# Patient Record
Sex: MALE | Race: WHITE | NOT HISPANIC OR LATINO | Employment: FULL TIME | URBAN - METROPOLITAN AREA
[De-identification: names, ages, dates, MRNs, and addresses within clinical notes are randomized per-mention and may not be internally consistent; named-entity substitution may affect disease eponyms.]

---

## 2017-01-28 ENCOUNTER — APPOINTMENT (OUTPATIENT)
Dept: RADIOLOGY | Facility: MEDICAL CENTER | Age: 22
DRG: 699 | End: 2017-01-28
Attending: EMERGENCY MEDICINE
Payer: COMMERCIAL

## 2017-01-28 ENCOUNTER — HOSPITAL ENCOUNTER (OUTPATIENT)
Dept: RADIOLOGY | Facility: MEDICAL CENTER | Age: 22
End: 2017-01-28

## 2017-01-28 ENCOUNTER — HOSPITAL ENCOUNTER (INPATIENT)
Facility: MEDICAL CENTER | Age: 22
LOS: 1 days | DRG: 699 | End: 2017-01-30
Attending: EMERGENCY MEDICINE | Admitting: SURGERY
Payer: COMMERCIAL

## 2017-01-28 LAB
ABO GROUP BLD: NORMAL
ABO GROUP BLD: NORMAL
ALBUMIN SERPL BCP-MCNC: 4.7 G/DL (ref 3.2–4.9)
ALBUMIN/GLOB SERPL: 1.9 G/DL
ALP SERPL-CCNC: 55 U/L (ref 30–99)
ALT SERPL-CCNC: 17 U/L (ref 2–50)
ANION GAP SERPL CALC-SCNC: 13 MMOL/L (ref 0–11.9)
APTT PPP: 26 SEC (ref 24.7–36)
AST SERPL-CCNC: 27 U/L (ref 12–45)
BASOPHILS # BLD AUTO: 0.3 % (ref 0–1.8)
BASOPHILS # BLD: 0.03 K/UL (ref 0–0.12)
BILIRUB SERPL-MCNC: 1 MG/DL (ref 0.1–1.5)
BLD GP AB SCN SERPL QL: NORMAL
BUN SERPL-MCNC: 16 MG/DL (ref 8–22)
CALCIUM SERPL-MCNC: 9.2 MG/DL (ref 8.5–10.5)
CHLORIDE SERPL-SCNC: 103 MMOL/L (ref 96–112)
CO2 SERPL-SCNC: 21 MMOL/L (ref 20–33)
CREAT SERPL-MCNC: 1.72 MG/DL (ref 0.5–1.4)
EOSINOPHIL # BLD AUTO: 0.01 K/UL (ref 0–0.51)
EOSINOPHIL NFR BLD: 0.1 % (ref 0–6.9)
ERYTHROCYTE [DISTWIDTH] IN BLOOD BY AUTOMATED COUNT: 44 FL (ref 35.9–50)
GFR SERPL CREATININE-BSD FRML MDRD: 50 ML/MIN/1.73 M 2
GLOBULIN SER CALC-MCNC: 2.5 G/DL (ref 1.9–3.5)
GLUCOSE SERPL-MCNC: 116 MG/DL (ref 65–99)
HCT VFR BLD AUTO: 42.8 % (ref 42–52)
HGB BLD-MCNC: 15 G/DL (ref 14–18)
IMM GRANULOCYTES # BLD AUTO: 0.04 K/UL (ref 0–0.11)
IMM GRANULOCYTES NFR BLD AUTO: 0.3 % (ref 0–0.9)
INR PPP: 1.03 (ref 0.87–1.13)
LYMPHOCYTES # BLD AUTO: 0.68 K/UL (ref 1–4.8)
LYMPHOCYTES NFR BLD: 5.8 % (ref 22–41)
MCH RBC QN AUTO: 33.3 PG (ref 27–33)
MCHC RBC AUTO-ENTMCNC: 35 G/DL (ref 33.7–35.3)
MCV RBC AUTO: 94.9 FL (ref 81.4–97.8)
MONOCYTES # BLD AUTO: 0.27 K/UL (ref 0–0.85)
MONOCYTES NFR BLD AUTO: 2.3 % (ref 0–13.4)
NEUTROPHILS # BLD AUTO: 10.64 K/UL (ref 1.82–7.42)
NEUTROPHILS NFR BLD: 91.2 % (ref 44–72)
NRBC # BLD AUTO: 0 K/UL
NRBC BLD AUTO-RTO: 0 /100 WBC
PLATELET # BLD AUTO: 202 K/UL (ref 164–446)
PMV BLD AUTO: 9.1 FL (ref 9–12.9)
POTASSIUM SERPL-SCNC: 4.5 MMOL/L (ref 3.6–5.5)
PROT SERPL-MCNC: 7.2 G/DL (ref 6–8.2)
PROTHROMBIN TIME: 13.8 SEC (ref 12–14.6)
RBC # BLD AUTO: 4.51 M/UL (ref 4.7–6.1)
RH BLD: NORMAL
SODIUM SERPL-SCNC: 137 MMOL/L (ref 135–145)
WBC # BLD AUTO: 11.7 K/UL (ref 4.8–10.8)

## 2017-01-28 PROCEDURE — 700111 HCHG RX REV CODE 636 W/ 250 OVERRIDE (IP): Performed by: EMERGENCY MEDICINE

## 2017-01-28 PROCEDURE — 81001 URINALYSIS AUTO W/SCOPE: CPT

## 2017-01-28 PROCEDURE — 36415 COLL VENOUS BLD VENIPUNCTURE: CPT

## 2017-01-28 PROCEDURE — 94760 N-INVAS EAR/PLS OXIMETRY 1: CPT

## 2017-01-28 PROCEDURE — G0390 TRAUMA RESPONS W/HOSP CRITI: HCPCS

## 2017-01-28 PROCEDURE — 96376 TX/PRO/DX INJ SAME DRUG ADON: CPT

## 2017-01-28 PROCEDURE — 80053 COMPREHEN METABOLIC PANEL: CPT

## 2017-01-28 PROCEDURE — 86850 RBC ANTIBODY SCREEN: CPT

## 2017-01-28 PROCEDURE — 305948 HCHG GREEN TRAUMA ACT PRE-NOTIFY NO CC

## 2017-01-28 PROCEDURE — 87086 URINE CULTURE/COLONY COUNT: CPT

## 2017-01-28 PROCEDURE — 85730 THROMBOPLASTIN TIME PARTIAL: CPT

## 2017-01-28 PROCEDURE — 85025 COMPLETE CBC W/AUTO DIFF WBC: CPT

## 2017-01-28 PROCEDURE — 85610 PROTHROMBIN TIME: CPT

## 2017-01-28 PROCEDURE — 86900 BLOOD TYPING SEROLOGIC ABO: CPT

## 2017-01-28 PROCEDURE — 99285 EMERGENCY DEPT VISIT HI MDM: CPT

## 2017-01-28 PROCEDURE — 71010 DX-CHEST-LIMITED (1 VIEW): CPT

## 2017-01-28 PROCEDURE — 86901 BLOOD TYPING SEROLOGIC RH(D): CPT

## 2017-01-28 PROCEDURE — 96375 TX/PRO/DX INJ NEW DRUG ADDON: CPT

## 2017-01-28 PROCEDURE — 96374 THER/PROPH/DIAG INJ IV PUSH: CPT

## 2017-01-28 RX ORDER — ONDANSETRON 2 MG/ML
4 INJECTION INTRAMUSCULAR; INTRAVENOUS ONCE
Status: COMPLETED | OUTPATIENT
Start: 2017-01-28 | End: 2017-01-28

## 2017-01-28 RX ADMIN — ONDANSETRON 4 MG: 2 INJECTION, SOLUTION INTRAMUSCULAR; INTRAVENOUS at 23:43

## 2017-01-28 RX ADMIN — HYDROMORPHONE HYDROCHLORIDE 0.5 MG: 1 INJECTION, SOLUTION INTRAMUSCULAR; INTRAVENOUS; SUBCUTANEOUS at 23:43

## 2017-01-28 NOTE — IP AVS SNAPSHOT
1/30/2017          Job Morrison  [address] [place] [address]    Dear Job:    [facility] wants to ensure your discharge home is safe and you or your loved ones have had all your questions answered regarding your care after you leave the hospital.    You may receive a telephone call within two days of your discharge.  This call is to make certain you understand your discharge instructions as well as ensure we provided you with the best care possible during your stay with us.     The call will only last approximately 3-5 minutes and will be done by a nurse.    Once again, we want to ensure your discharge home is safe and that you have a clear understanding of any next steps in your care.  If you have any questions or concerns, please do not hesitate to contact us, we are here for you.  Thank you for choosing [facility] for your healthcare needs.    Sincerely,    Bro Umanzor    [facility]

## 2017-01-28 NOTE — IP AVS SNAPSHOT
" After Visit Summary                                                                                                                  Name:Job Morrison  Medical Record Number:[id number]  CSN: [id number]    YOB: 1995   Age: 21 y.o.  Sex: male  HT:1.778 m (5' 10\") WT: 63.9 kg (140 lb 14 oz)          Admit Date: 1/28/2017     Discharge Date:   Today's Date: 1/30/2017  Attending Doctor:  Enoc Capellan M.D.                  Allergies:  Review of patient's allergies indicates no known allergies.            Discharge Instructions       Discharge Instructions    Discharged to home by car with relative. Discharged via wheelchair, hospital escort: Yes.  Special equipment needed: Not Applicable    Be sure to schedule a follow-up appointment with your primary care doctor or any specialists as instructed.     Discharge Plan:   Diet Plan: Discussed  Activity Level: Discussed  Confirmed Follow up Appointment: Patient to Call and Schedule Appointment  Confirmed Symptoms Management: Discussed  Medication Reconciliation Updated: Yes  Influenza Vaccine Indication: Patient Refuses    I understand that a diet low in cholesterol, fat, and sodium is recommended for good health. Unless I have been given specific instructions below for another diet, I accept this instruction as my diet prescription.   Other diet:     Special Instructions:     - Call or seek medical attention for questions or concerns   - Follow up with Dr. Capellan or provider in [place] in 1 weeks time   - Follow up with primary care provider within one weeks time   - Resume regular diet   - Continue daily over the counter stool softener while on narcotics   - No operation of machinery or motorized vehicles while under the influence of narcotics   - No alcohol use while under the influence of narcotics   - No swimming, hot tubs, baths or wound submersion until cleared by outpatient provider. May shower   - No contact sports, strenuous activities, or heavy " lifting until cleared by outpatient provider   - If respiratory decompensation, inability to void or blood in urine, or signs or symptoms of infection occur seek medical attention      · Is patient discharged on Warfarin / Coumadin?   No     · Is patient Post Blood Transfusion?  No    Depression / Suicide Risk    As you are discharged from this [facility] facility, it is important to learn how to keep safe from harming yourself.    Recognize the warning signs:  · Abrupt changes in personality, positive or negative- including increase in energy   · Giving away possessions  · Change in eating patterns- significant weight changes-  positive or negative  · Change in sleeping patterns- unable to sleep or sleeping all the time   · Unwillingness or inability to communicate  · Depression  · Unusual sadness, discouragement and loneliness  · Talk of wanting to die  · Neglect of personal appearance   · Rebelliousness- reckless behavior  · Withdrawal from people/activities they love  · Confusion- inability to concentrate     If you or a loved one observes any of these behaviors or has concerns about self-harm, here's what you can do:  · Talk about it- your feelings and reasons for harming yourself  · Remove any means that you might use to hurt yourself (examples: pills, rope, extension cords, firearm)  · Get professional help from the community (Mental Health, Substance Abuse, psychological counseling)  · Do not be alone:Call your Safe Contact- someone whom you trust who will be there for you.  · Call your local CRISIS HOTLINE 424-5884 or 243-574-7235  · Call your local Children's Mobile Crisis Response Team [place] (582) 085-1785 or www.[employer]  · Call the toll free National Suicide Prevention Hotlines   · National Suicide Prevention Lifeline 628-631-SQSI (4700)  · National Hope Line Network 800-SUICIDE (632-4273)    Acute Kidney Injury  Acute kidney injury is any condition in which there is sudden (acute)  damage to the kidneys. Acute kidney injury was previously known as acute kidney failure or acute renal failure. The kidneys are two organs that lie on either side of the spine between the middle of the back and the front of the abdomen. The kidneys:  · Remove wastes and extra water from the blood.    · Produce important hormones. These help keep bones strong, regulate blood pressure, and help create red blood cells.    · Balance the fluids and chemicals in the blood and tissues.  A small amount of kidney damage may not cause problems, but a large amount of damage may make it difficult or impossible for the kidneys to work the way they should. Acute kidney injury may develop into long-lasting (chronic) kidney disease. It may also develop into a life-threatening disease called end-stage kidney disease. Acute kidney injury can get worse very quickly, so it should be treated right away. Early treatment may prevent other kidney diseases from developing.  CAUSES   · A problem with blood flow to the kidneys. This may be caused by:    ¨ Blood loss.    ¨ Heart disease.    ¨ Severe burns.    ¨ Liver disease.  · Direct damage to the kidneys. This may be caused by:  ¨ Some medicines.    ¨ A kidney infection.    ¨ Poisoning or consuming toxic substances.    ¨ A surgical wound.    ¨ A blow to the kidney area.    · A problem with urine flow. This may be caused by:    ¨ Cancer.    ¨ Kidney stones.    ¨ An enlarged prostate.  SIGNS AND SYMPTOMS   · Swelling (edema) of the legs, ankles, or feet.    · Tiredness (lethargy).    · Nausea or vomiting.    · Confusion.    · Problems with urination, such as:    ¨ Painful or burning feeling during urination.    ¨ Decreased urine production.    ¨ Frequent accidents in children who are potty trained.    ¨ Bloody urine.    · Muscle twitches and cramps.    · Shortness of breath.    · Seizures.    · Chest pain or pressure.  Sometimes, no symptoms are present.   DIAGNOSIS  Acute kidney injury may  be detected and diagnosed by tests, including blood, urine, imaging, or kidney biopsy tests.   TREATMENT  Treatment of acute kidney injury varies depending on the cause and severity of the kidney damage. In mild cases, no treatment may be needed. The kidneys may heal on their own. If acute kidney injury is more severe, your health care provider will treat the cause of the kidney damage, help the kidneys heal, and prevent complications from occurring. Severe cases may require a procedure to remove toxic wastes from the body (dialysis) or surgery to repair kidney damage. Surgery may involve:   · Repair of a torn kidney.    · Removal of an obstruction.  HOME CARE INSTRUCTIONS  · Follow your prescribed diet.  · Take medicines only as directed by your health care provider.   · Do not take any new medicines (prescription, over-the-counter, or nutritional supplements) unless approved by your health care provider. Many medicines can worsen your kidney damage or may need to have the dose adjusted.    · Keep all follow-up visits as directed by your health care provider. This is important.  · Observe your condition to make sure you are healing as expected.  SEEK IMMEDIATE MEDICAL CARE IF:  · You are feeling ill or have severe pain in the back or side.    · Your symptoms return or you have new symptoms.  · You have any symptoms of end-stage kidney disease. These include:    ¨ Persistent itchiness.    ¨ Loss of appetite.    ¨ Headaches.    ¨ Abnormally dark or light skin.  ¨ Numbness in the hands or feet.    ¨ Easy bruising.    ¨ Frequent hiccups.    ¨ Menstruation stops.    · You have a fever.  · You have increased urine production.  · You have pain or bleeding when urinating.  MAKE SURE YOU:   · Understand these instructions.  · Will watch your condition.  · Will get help right away if you are not doing well or get worse.     This information is not intended to replace advice given to you by your health care provider. Make  sure you discuss any questions you have with your health care provider.     Document Released: 07/02/2012 Document Revised: 01/08/2016 Document Reviewed: 08/16/2013  [employer] Interactive Patient Education ©2016 [employer]        Follow-up Information     1. Follow up with Enoc Capellan M.D.. Schedule an appointment as soon as possible for a visit in 1 week.    Specialty:  Surgery    Contact information    [address] [facility] [address]  [place] [address] [phone]          2. Schedule an appointment as soon as possible for a visit with Pcp Not In Computer.    Specialty:  Family Medicine         Discharge Medication Instructions:    Below are the medications your physician expects you to take upon discharge:    Review all your home medications and newly ordered medications with your doctor and/or pharmacist. Follow medication instructions as directed by your doctor and/or pharmacist.    Please keep your medication list with you and share with your physician.               Medication List      START taking these medications        Instructions    acetaminophen 325 MG Tabs   Last time this was given:  650 mg on 1/30/2017  1:13 PM   Commonly known as:  TYLENOL    Take 2 Tabs by mouth every 6 hours.   Dose:  650 mg        MG Caps   Last time this was given:  100 mg on 1/30/2017  1:13 PM    Doctor's comments:  While on narcotic pain medication   Take 100 mg by mouth 2 times a day.   Dose:  100 mg       oxycodone immediate-release 5 MG Tabs   Last time this was given:  5 mg on 1/30/2017  9:03 AM   Commonly known as:  ROXICODONE    Take 1 Tab by mouth every 3 hours as needed (Moderate Pain (NRS Pain Scale 4-6; CPOT Pain Scale 3-5)).   Dose:  5 mg               Instructions           Diet / Nutrition:    Follow any diet instructions given to you by your doctor or the dietician, including how much salt (sodium) you are allowed each day.    If you are overweight, talk to your doctor about a weight reduction  plan.    Activity:    Remain physically active following your doctor's instructions about exercise and activity.    Rest often.     Any time you become even a little tired or short of breath, SIT DOWN and rest.    Worsening Symptoms:    Report any of the following signs and symptoms to the doctor's office immediately:    *Pain of jaw, arm, or neck  *Chest pain not relieved by medication                               *Dizziness or loss of consciousness  *Difficulty breathing even when at rest   *More tired than usual                                       *Bleeding drainage or swelling of surgical site  *Swelling of feet, ankles, legs or stomach                 *Fever (>100ºF)  *Pink or blood tinged sputum  *Weight gain (3lbs/day or 5lbs /week)           *Shock from internal defibrillator (if applicable)  *Palpitations or irregular heartbeats                *Cool and/or numb extremities    Stroke Awareness    Common Risk Factors for Stroke include:    Age  Atrial Fibrillation  Carotid Artery Stenosis  Diabetes Mellitus  Excessive alcohol consumption  High blood pressure  Overweight   Physical inactivity  Smoking    Warning signs and symptoms of a stroke include:    *Sudden numbness or weakness of the face, arm or leg (especially on one side of the body).  *Sudden confusion, trouble speaking or understanding.  *Sudden trouble seeing in one or both eyes.  *Sudden trouble walking, dizziness, loss of balance or coordination.Sudden severe headache with no known cause.    It is very important to get treatment quickly when a stroke occurs. If you experience any of the above warning signs, call 911 immediately.                   Disclaimer         Quit Smoking / Tobacco Use:    I understand the use of any tobacco products increases my chance of suffering from future heart disease or stroke and could cause other illnesses which may shorten my life. Quitting the use of tobacco products is the single most important thing I can  do to improve my health. For further information on smoking / tobacco cessation call a Toll Free Quit Line at 1-525.968.4899 (*National Cancer [place]) or 1-744.198.1485 (American Lung Association) or you can access the web based program at www.lungusa.org.    [place] Tobacco Users Help Line:  (881) 149-6785       Toll Free: 1-865.718.5143  Quit Tobacco Program [facility] (667)825-4117    Crisis Hotline:    [place] Crisis Hotline:  8-369-KHPJJAI or 1-300.924.8411    [place] Crisis Hotline:    1-413.254.6380 or 861-623-0400    Discharge Survey:   Thank you for choosing [facility]. We hope we did everything we could to make your hospital stay a pleasant one. You may be receiving a phone survey and we would appreciate your time and participation in answering the questions. Your input is very valuable to us in our efforts to improve our service to our patients and their families.        My signature on this form indicates that:    1. I have reviewed and understand the above information.  2. My questions regarding this information have been answered to my satisfaction.  3. I have formulated a plan with my discharge nurse to obtain my prescribed medications for home.                  Disclaimer         __________________________________                     __________       ________                       Patient Signature                                                 Date                    Time

## 2017-01-28 NOTE — IP AVS SNAPSHOT
Funambol Access Code: [email or web address]  Expires: 2/28/2017 12:25 AM    Funambol  A secure, online tool to manage your health information     [employer]’s Funambol® is a secure, online tool that connects you to your personalized health information from the privacy of your home -- day or night - making it very easy for you to manage your healthcare. Once the activation process is completed, you can even access your medical information using the Funambol miranda, which is available for free in the Apple Miranda store or Google Play store.     Funambol provides the following levels of access (as shown below):   My Chart Features   [facility] Primary Care Doctor [facility]  Specialists [facility]  Urgent  Care Non-[facility]  Primary Care  Doctor   Email your healthcare team securely and privately 24/7 X X X X   Manage appointments: schedule your next appointment; view details of past/upcoming appointments X      Request prescription refills. X      View recent personal medical records, including lab and immunizations X X X X   View health record, including health history, allergies, medications X X X X   Read reports about your outpatient visits, procedures, consult and ER notes X X X X   See your discharge summary, which is a recap of your hospital and/or ER visit that includes your diagnosis, lab results, and care plan. X X       How to register for Funambol:  1. Go to  [email or web address].  2. Click on the Sign Up Now box, which takes you to the New Member Sign Up page. You will need to provide the following information:  a. Enter your Funambol Access Code exactly as it appears at the top of this page. (You will not need to use this code after you’ve completed the sign-up process. If you do not sign up before the expiration date, you must request a new code.)   b. Enter your date of birth.   c. Enter your home email address.   d. Click Submit, and follow the next screen’s instructions.  3. Create a Funambol ID. This will be your Funambol  login ID and cannot be changed, so think of one that is secure and easy to remember.  4. Create a [employer] password. You can change your password at any time.  5. Enter your Password Reset Question and Answer. This can be used at a later time if you forget your password.   6. Enter your e-mail address. This allows you to receive e-mail notifications when new information is available in [employer].  7. Click Sign Up. You can now view your health information.    For assistance activating your [employer] account, call [phone]

## 2017-01-28 NOTE — IP AVS SNAPSHOT
" <p align=\"LEFT\"><IMG SRC=\"//[email or web address]$/[email or web address]\" alt=\"Image\" WIDTH=\"50%\" HEIGHT=\"200\" BORDER=\"\"></p>                   Name:Job Morrison  Medical Record Number:[id number]  CSN: [id number]    YOB: 1995   Age: 21 y.o.  Sex: male  HT:1.778 m (5' 10\") WT: 63.9 kg (140 lb 14 oz)          Admit Date: 1/28/2017     Discharge Date:   Today's Date: 1/30/2017  Attending Doctor:  Enoc Capellan M.D.                  Allergies:  Review of patient's allergies indicates no known allergies.          Follow-up Information     1. Follow up with Enoc Capellan M.D.. Schedule an appointment as soon as possible for a visit in 1 week.    Specialty:  Surgery    Contact information    [address] [facility] [address]  [place] [address] [phone]          2. Schedule an appointment as soon as possible for a visit with Pcp Not In Computer.    Specialty:  Family Medicine         Medication List      Take these Medications        Instructions    acetaminophen 325 MG Tabs   Commonly known as:  TYLENOL    Take 2 Tabs by mouth every 6 hours.   Dose:  650 mg        MG Caps    Doctor's comments:  While on narcotic pain medication   Take 100 mg by mouth 2 times a day.   Dose:  100 mg       oxycodone immediate-release 5 MG Tabs   Commonly known as:  ROXICODONE    Take 1 Tab by mouth every 3 hours as needed (Moderate Pain (NRS Pain Scale 4-6; CPOT Pain Scale 3-5)).   Dose:  5 mg         "

## 2017-01-29 PROBLEM — S37.001A: Status: ACTIVE | Noted: 2017-01-29

## 2017-01-29 LAB
ANION GAP SERPL CALC-SCNC: 6 MMOL/L (ref 0–11.9)
APPEARANCE UR: CLEAR
BASOPHILS # BLD AUTO: 0.2 % (ref 0–1.8)
BASOPHILS # BLD: 0.02 K/UL (ref 0–0.12)
BILIRUB UR QL STRIP.AUTO: NEGATIVE
BUN SERPL-MCNC: 12 MG/DL (ref 8–22)
CALCIUM SERPL-MCNC: 8.8 MG/DL (ref 8.5–10.5)
CHLORIDE SERPL-SCNC: 104 MMOL/L (ref 96–112)
CO2 SERPL-SCNC: 26 MMOL/L (ref 20–33)
COLOR UR: YELLOW
CREAT SERPL-MCNC: 1.59 MG/DL (ref 0.5–1.4)
CULTURE IF INDICATED INDCX: YES UA CULTURE
EOSINOPHIL # BLD AUTO: 0.03 K/UL (ref 0–0.51)
EOSINOPHIL NFR BLD: 0.4 % (ref 0–6.9)
ERYTHROCYTE [DISTWIDTH] IN BLOOD BY AUTOMATED COUNT: 45.6 FL (ref 35.9–50)
GFR SERPL CREATININE-BSD FRML MDRD: 54 ML/MIN/1.73 M 2
GLUCOSE SERPL-MCNC: 93 MG/DL (ref 65–99)
GLUCOSE UR STRIP.AUTO-MCNC: NEGATIVE MG/DL
HCT VFR BLD AUTO: 41.1 % (ref 42–52)
HGB BLD-MCNC: 14.1 G/DL (ref 14–18)
IMM GRANULOCYTES # BLD AUTO: 0.01 K/UL (ref 0–0.11)
IMM GRANULOCYTES NFR BLD AUTO: 0.1 % (ref 0–0.9)
KETONES UR STRIP.AUTO-MCNC: 80 MG/DL
LEUKOCYTE ESTERASE UR QL STRIP.AUTO: NEGATIVE
LYMPHOCYTES # BLD AUTO: 2.47 K/UL (ref 1–4.8)
LYMPHOCYTES NFR BLD: 28.9 % (ref 22–41)
MCH RBC QN AUTO: 33.3 PG (ref 27–33)
MCHC RBC AUTO-ENTMCNC: 34.3 G/DL (ref 33.7–35.3)
MCV RBC AUTO: 97.2 FL (ref 81.4–97.8)
MICRO URNS: ABNORMAL
MONOCYTES # BLD AUTO: 0.77 K/UL (ref 0–0.85)
MONOCYTES NFR BLD AUTO: 9 % (ref 0–13.4)
MUCOUS THREADS #/AREA URNS HPF: ABNORMAL /HPF
NEUTROPHILS # BLD AUTO: 5.25 K/UL (ref 1.82–7.42)
NEUTROPHILS NFR BLD: 61.4 % (ref 44–72)
NITRITE UR QL STRIP.AUTO: NEGATIVE
NRBC # BLD AUTO: 0 K/UL
NRBC BLD AUTO-RTO: 0 /100 WBC
PH UR STRIP.AUTO: 6.5 [PH]
PLATELET # BLD AUTO: 175 K/UL (ref 164–446)
PMV BLD AUTO: 9.1 FL (ref 9–12.9)
POTASSIUM SERPL-SCNC: 4.5 MMOL/L (ref 3.6–5.5)
PROT UR QL STRIP: NEGATIVE MG/DL
RBC # BLD AUTO: 4.23 M/UL (ref 4.7–6.1)
RBC # URNS HPF: ABNORMAL /HPF
RBC UR QL AUTO: ABNORMAL
SODIUM SERPL-SCNC: 136 MMOL/L (ref 135–145)
SP GR UR STRIP.AUTO: 1.03
WBC # BLD AUTO: 8.6 K/UL (ref 4.8–10.8)
WBC #/AREA URNS HPF: ABNORMAL /HPF

## 2017-01-29 PROCEDURE — 700112 HCHG RX REV CODE 229: Performed by: SURGERY

## 2017-01-29 PROCEDURE — 700105 HCHG RX REV CODE 258: Performed by: SURGERY

## 2017-01-29 PROCEDURE — 700102 HCHG RX REV CODE 250 W/ 637 OVERRIDE(OP): Performed by: SURGERY

## 2017-01-29 PROCEDURE — A9270 NON-COVERED ITEM OR SERVICE: HCPCS | Performed by: SURGERY

## 2017-01-29 PROCEDURE — 700111 HCHG RX REV CODE 636 W/ 250 OVERRIDE (IP): Performed by: EMERGENCY MEDICINE

## 2017-01-29 PROCEDURE — 700111 HCHG RX REV CODE 636 W/ 250 OVERRIDE (IP): Performed by: SURGERY

## 2017-01-29 PROCEDURE — 80048 BASIC METABOLIC PNL TOTAL CA: CPT

## 2017-01-29 PROCEDURE — 770006 HCHG ROOM/CARE - MED/SURG/GYN SEMI*

## 2017-01-29 PROCEDURE — 36415 COLL VENOUS BLD VENIPUNCTURE: CPT

## 2017-01-29 PROCEDURE — 85025 COMPLETE CBC W/AUTO DIFF WBC: CPT

## 2017-01-29 RX ORDER — MORPHINE SULFATE 4 MG/ML
1-2 INJECTION, SOLUTION INTRAMUSCULAR; INTRAVENOUS
Status: DISCONTINUED | OUTPATIENT
Start: 2017-01-29 | End: 2017-01-30 | Stop reason: HOSPADM

## 2017-01-29 RX ORDER — POLYETHYLENE GLYCOL 3350 17 G/17G
1 POWDER, FOR SOLUTION ORAL 2 TIMES DAILY
Status: DISCONTINUED | OUTPATIENT
Start: 2017-01-29 | End: 2017-01-30 | Stop reason: HOSPADM

## 2017-01-29 RX ORDER — AMOXICILLIN 250 MG
1 CAPSULE ORAL NIGHTLY
Status: DISCONTINUED | OUTPATIENT
Start: 2017-01-29 | End: 2017-01-30 | Stop reason: HOSPADM

## 2017-01-29 RX ORDER — AMOXICILLIN 250 MG
1 CAPSULE ORAL
Status: DISCONTINUED | OUTPATIENT
Start: 2017-01-29 | End: 2017-01-30 | Stop reason: HOSPADM

## 2017-01-29 RX ORDER — SODIUM CHLORIDE 9 MG/ML
INJECTION, SOLUTION INTRAVENOUS CONTINUOUS
Status: DISCONTINUED | OUTPATIENT
Start: 2017-01-29 | End: 2017-01-30 | Stop reason: HOSPADM

## 2017-01-29 RX ORDER — ACETAMINOPHEN 325 MG/1
650 TABLET ORAL EVERY 6 HOURS
Status: DISCONTINUED | OUTPATIENT
Start: 2017-01-29 | End: 2017-01-30 | Stop reason: HOSPADM

## 2017-01-29 RX ORDER — ONDANSETRON 2 MG/ML
4 INJECTION INTRAMUSCULAR; INTRAVENOUS EVERY 4 HOURS PRN
Status: DISCONTINUED | OUTPATIENT
Start: 2017-01-29 | End: 2017-01-30 | Stop reason: HOSPADM

## 2017-01-29 RX ORDER — ENEMA 19; 7 G/133ML; G/133ML
1 ENEMA RECTAL
Status: DISCONTINUED | OUTPATIENT
Start: 2017-01-29 | End: 2017-01-30 | Stop reason: HOSPADM

## 2017-01-29 RX ORDER — OXYCODONE HYDROCHLORIDE 5 MG/1
5 TABLET ORAL
Status: DISCONTINUED | OUTPATIENT
Start: 2017-01-29 | End: 2017-01-30 | Stop reason: HOSPADM

## 2017-01-29 RX ORDER — DOCUSATE SODIUM 100 MG/1
100 CAPSULE, LIQUID FILLED ORAL 2 TIMES DAILY
Status: DISCONTINUED | OUTPATIENT
Start: 2017-01-29 | End: 2017-01-30 | Stop reason: HOSPADM

## 2017-01-29 RX ORDER — OXYCODONE HYDROCHLORIDE 10 MG/1
10 TABLET ORAL
Status: DISCONTINUED | OUTPATIENT
Start: 2017-01-29 | End: 2017-01-30 | Stop reason: HOSPADM

## 2017-01-29 RX ORDER — BISACODYL 10 MG
10 SUPPOSITORY, RECTAL RECTAL
Status: DISCONTINUED | OUTPATIENT
Start: 2017-01-29 | End: 2017-01-30 | Stop reason: HOSPADM

## 2017-01-29 RX ADMIN — DOCUSATE SODIUM 100 MG: 100 CAPSULE ORAL at 03:55

## 2017-01-29 RX ADMIN — Medication 1 TABLET: at 21:10

## 2017-01-29 RX ADMIN — MORPHINE SULFATE 2 MG: 4 INJECTION INTRAVENOUS at 21:10

## 2017-01-29 RX ADMIN — OXYCODONE HYDROCHLORIDE 10 MG: 10 TABLET ORAL at 08:18

## 2017-01-29 RX ADMIN — OXYCODONE HYDROCHLORIDE 10 MG: 10 TABLET ORAL at 15:37

## 2017-01-29 RX ADMIN — HYDROMORPHONE HYDROCHLORIDE 0.5 MG: 1 INJECTION, SOLUTION INTRAMUSCULAR; INTRAVENOUS; SUBCUTANEOUS at 03:03

## 2017-01-29 RX ADMIN — ACETAMINOPHEN 650 MG: 325 TABLET, FILM COATED ORAL at 05:00

## 2017-01-29 RX ADMIN — MORPHINE SULFATE 2 MG: 4 INJECTION INTRAVENOUS at 16:23

## 2017-01-29 RX ADMIN — SODIUM CHLORIDE: 9 INJECTION, SOLUTION INTRAVENOUS at 14:56

## 2017-01-29 RX ADMIN — SODIUM CHLORIDE: 9 INJECTION, SOLUTION INTRAVENOUS at 03:56

## 2017-01-29 RX ADMIN — ACETAMINOPHEN 650 MG: 325 TABLET, FILM COATED ORAL at 18:11

## 2017-01-29 RX ADMIN — ACETAMINOPHEN 650 MG: 325 TABLET, FILM COATED ORAL at 12:26

## 2017-01-29 RX ADMIN — POLYETHYLENE GLYCOL 3350 1 PACKET: 17 POWDER, FOR SOLUTION ORAL at 21:10

## 2017-01-29 RX ADMIN — Medication 1 TABLET: at 03:55

## 2017-01-29 RX ADMIN — MORPHINE SULFATE 2 MG: 4 INJECTION INTRAVENOUS at 18:11

## 2017-01-29 ASSESSMENT — LIFESTYLE VARIABLES
TOTAL SCORE: 0
AVERAGE NUMBER OF DAYS PER WEEK YOU HAVE A DRINK CONTAINING ALCOHOL: 2
HAVE PEOPLE ANNOYED YOU BY CRITICIZING YOUR DRINKING: NO
HAVE YOU EVER FELT YOU SHOULD CUT DOWN ON YOUR DRINKING: NO
TOTAL SCORE: 0
EVER_SMOKED: NEVER
ON A TYPICAL DAY WHEN YOU DRINK ALCOHOL HOW MANY DRINKS DO YOU HAVE: 2
TOTAL SCORE: 0
EVER FELT BAD OR GUILTY ABOUT YOUR DRINKING: NO
CONSUMPTION TOTAL: NEGATIVE
HOW MANY TIMES IN THE PAST YEAR HAVE YOU HAD 5 OR MORE DRINKS IN A DAY: 0
ALCOHOL_USE: YES
EVER HAD A DRINK FIRST THING IN THE MORNING TO STEADY YOUR NERVES TO GET RID OF A HANGOVER: NO

## 2017-01-29 ASSESSMENT — PAIN SCALES - GENERAL
PAINLEVEL_OUTOF10: 0
PAINLEVEL_OUTOF10: 6
PAINLEVEL_OUTOF10: 3
PAINLEVEL_OUTOF10: 7

## 2017-01-29 ASSESSMENT — ENCOUNTER SYMPTOMS
ABDOMINAL PAIN: 1
FLANK PAIN: 1

## 2017-01-29 NOTE — PROGRESS NOTES
Pt arrived to floor via transport on 2L NC O2. Transferred from [facility] to hospital bed in [place] [address].

## 2017-01-29 NOTE — H&P
REASON FOR ADMISSION:  Trauma green transfer.    HISTORY:  The patient is a 21-year-old male from the [place] area.  He was [place]   [place] [employer].  He fell hard on the last run of the day on his snowboard.  He   fell forward, landing on his stomach and right side.  The patient had   significant pain in the right flank and back.  It was unrelenting.  The   patient was taken for evaluation and transferred to the hospital for imaging.    He was found to have a rupture of the right kidney.  He remained   hemodynamically stable.  He was transferred here for further evaluation and   care.    PAST MEDICAL HISTORY:  His past medical history is benign.  He has no active   medical problems.  He has noted that he has been running a slightly elevated   creatinine on laboratories due to heavy workouts.  Apparently, he had his   creatinine checked a year ago and was told that it was high for that reason.    The patient has no other active medical problems.    ALLERGIES:  No allergies to medications.    SOCIAL HISTORY:  He smokes pot, but has no other addictions.    FAMILY HISTORY:  The patient has no contributory family history.    REVIEW OF SYSTEMS:  His review of systems is primarily positive for pain in   the right flank and abdomen.  He has not been nauseated or vomiting.  He is   otherwise noncontributory.    PHYSICAL EXAMINATION:  His physical examination shows:  VITAL SIGNS:  His vital signs to be normal and strong.  HEENT:  Examination is atraumatic.  NEUROLOGIC: Examination is intact.  His cranial nerves are symmetrical.  He   moves all extremities.  His cognition is normal.  CHEST: The patient's chest is clear.  CARDIAC: His cardiac rhythm is regular.  ABDOMEN:  His abdomen is tender in the right upper quadrant to deep palpation.    There is no contusion of the abdominal wall or flank.  There is no   Grey-Frances's sign or [place]'s sign.  GENITOURINARY:  The patient's genitalia appeared to be normal.  His urine in   the nearby  urinal is relatively clear.  There are no clots.  EXTREMITIES:  His extremities are free of deformity, clubbing, edema or   evidence of acute injury.  His back is otherwise normal.    LABORATORY DATA:  Laboratories did demonstrate hematuria.  He also has marked   ketonuria.  The red cell count was 100 to 150 reds per high-powered field.    Coags were normal.  His electrolytes looked reasonably normal.  His creatinine   was elevated at 1.72.  There is no previous creatinine for reference.  His   white blood cell count was 11,700 with a hemoglobin of 15 grams.    IMAGING DATA:  Imaging demonstrated grade III injury of the right kidney with   perinephric hematoma.  There was no major extravasation of contrast.  There   was no other evidence of internal injury.  The patient does not currently have   peritonitis.    IMPRESSION:  At the time of admission, is closed injury of the right kidney,   most likely grade III.  The patient does have an elevated creatinine of   uncertain etiology.  He may be dehydrated.  He will receive hydration, will be   placed at bed rest, and admitted for serial laboratories and evaluations   clinically.  His prognosis is guarded, but most likely favorable in the long   run.  The patient will bear close watching as he is at risk for deterioration.    Time of evaluation, critical care setting is 75 minutes on 01/29/2017.       ____________________________________     MD RAJAT OWENS / ALISHA    DD:  01/29/2017 01:30:01  DT:  01/29/2017 02:35:43    D#:  [id number]  Job#:  [id number]

## 2017-01-29 NOTE — ED PROVIDER NOTES
ED Provider Note     Scribed for Jony Mendoza M.D. by Cornelio Up. 1/28/2017  10:45 PM    CHIEF COMPLAINT  Chief Complaint   Patient presents with   • Trauma Green     snowboarding accident       HPI  [place] is a 21 y.o. male who presents to the Emergency Department transferred from [place], [place] ED as a trauma green status post snowboard accident which occurred around 1530 today. Patient states he was traveling at high speed when he caught his edge and fell forward onto his right side. He was unable to snowboard after the accident. Patient complaining of rib pain and abdominal pain which became localized to right flank. Pain was constant and severe.  Prior to arrival patient was medicated with Dilaudid and Fentanyl and patient's pain improved. Patient denies head trauma or loss of consciousness. No dizziness, confusion, or blurred vision. Denies shortness of breath or chest pain. His last meal was lunch around 1230. No past medical history. Not currently taking any medications. Patient did have abdomen, pelvis CT at outlying facility which showed right kidney fracture and retroperitoneal bleed. Patient did have elevated creatine but states he has history of elevated creatine since he began exercising.     REVIEW OF SYSTEMS  See HPI for further details. All other systems are negative.     PAST MEDICAL HISTORY  Denies any pertinent past medical history.     SOCIAL HISTORY  Social History     Social History Main Topics   • Smoking status: Never Smoker    • Alcohol Use: Yes   • Drug Use: Yes     Special: Inhaled      Comment: marijuana       SURGICAL HISTORY  patient denies any surgical history    CURRENT MEDICATIONS  Home Medications     Reviewed by Nahomy Medley R.N. (Registered Nurse) on 01/28/17 at 3000  Med List Status: Complete    Medication Last Dose Status          Patient Wes Taking any Medications                        ALLERGIES  No Known Allergies    PHYSICAL EXAM  VITAL SIGNS: /81 mmHg  " Pulse 105  Temp(Src) 36.9 °C (98.4 °F)  Resp 18  Ht 1.778 m (5' 10\")  Wt 65.772 kg (145 lb)  BMI 20.81 kg/m2  SpO2 96%    PRIMARY SURVEY:    Airway: Phonating well,clear  Breathing: Equal breath sounds bilaterally  Circulation: Normal heart sounds 2+ pulses at bilateral radial and femoral arteries  Disability:  GCS 15  Exposure: No Gross deformity or hemorrhage.     Blood pressure 132/81, pulse 105, temperature 36.9 °C (98.4 °F), resp. rate 18, height 1.778 m (5' 10\"), weight 65.772 kg (145 lb), SpO2 96 %.    Secondary Survey:      Constitutional: Awake, alert, oriented x3.    Heent: Head is normocephalic, atraumatic Pupils 3mm reactive bilaterally. Midface stable. No malocclusion.  No hemotympanum bilaterally. No septal hematoma.  Neck: No tracheal deviation. No midline cervical spine tenderness.  Cardiovascular: Regular rate and rhythm no murmur rub or gallop intact distal pulses peripherally x4  Pulmonary/Chest: Clavicles nontender to palpation. There is not any chest wall tenderness bilaterally.  No crepitus. Positive breath sounds bilaterally.   Abdominal: Mild RLQ tenderness.Soft, nondistended.  Pelvis is stable to AP and lateral compression.   Musculoskeletal: Right upper extremity atraumatic, palpable radial pulse. 5/5  strength. Full ROM and strength at elbow.  Left upper extremity atraumatic, palpable radial pulse. 5/5  strength. Full ROM and strength at elbow.  Right lower extremity atraumatic. 5/5 strength in ankle plantar flexion and dorsiflexion. No pain and full ROM at right knee and hip.   Left  lower extremity atraumatic. 5/5 strength in ankle plantar flexion and dorsiflexion. No pain and full ROM at left knee and hip.   Back: Midline thoracic and lumbar spines are nontender to palpation. No step-offs. Mild CVA tenderness on the right without bruising.   Neurological: Sensation intact to light touch dorsum and plantar surfaces of both feet and the medial and lateral aspects of both " lower legs.  Sensation intact to light touch dorsum and plantar surfaces of both hands.   Skin: Skin is warm and dry.  No diaphoresis. No erythema. No pallor. No bruising   Psychiatric:  Normal mood and affect for the situation.  Behavior is appropriate.       DIAGNOSTIC STUDIES / PROCEDURES    LABS  Labs Reviewed   CBC WITH DIFFERENTIAL - Abnormal; Notable for the following:     WBC 11.7 (*)     RBC 4.51 (*)     MCH 33.3 (*)     Neutrophils-Polys 91.20 (*)     Lymphocytes 5.80 (*)     Neutrophils (Absolute) 10.64 (*)     Lymphs (Absolute) 0.68 (*)     All other components within normal limits    Narrative:     Indicate which anticoagulants the patient is on:->UNKNOWN   COMP METABOLIC PANEL - Abnormal; Notable for the following:     Anion Gap 13.0 (*)     Glucose 116 (*)     Creatinine 1.72 (*)     All other components within normal limits    Narrative:     Indicate which anticoagulants the patient is on:->UNKNOWN   ESTIMATED GFR - Abnormal; Notable for the following:     GFR If Non  50 (*)     All other components within normal limits    Narrative:     Indicate which anticoagulants the patient is on:->UNKNOWN   URINALYSIS,CULTURE IF INDICATED - Abnormal; Notable for the following:     Ketones 80 (*)     Occult Blood Large (*)     All other components within normal limits   URINE MICROSCOPIC (W/UA) - Abnormal; Notable for the following:     WBC 5-10 (*)     -150 (*)     All other components within normal limits   PROTHROMBIN TIME    Narrative:     Indicate which anticoagulants the patient is on:->UNKNOWN   APTT    Narrative:     Indicate which anticoagulants the patient is on:->UNKNOWN   COD (ADULT)    Narrative:     Does Physician's order indicate patient to be transfused?->No   ABO CONFIRMATION   URINE CULTURE(NEW)     All labs reviewed by me.    RADIOLOGY  DX-CHEST-LIMITED (1 VIEW)   Final Result      No evidence for acute intrathoracic injury.      OUTSIDE IMAGES-DX CHEST   Preliminary  Result      OUTSIDE IMAGES-CT ABDOMEN /PELVIS   Preliminary Result        The radiologist's interpretations of all radiological studies have been reviewed by me.      CT performed from outside facility.   CT Impression:  1.) Probable fractured right kedney posteriorly and inferiorly with a parenchyma meets the wall of the renal pelvis.  2.) Mild active extravasation seen into a blood filled right renal pelvis. The ureter is also blood filled.  3.) The right kidney is maintaining function at this time and there is no evidence for urine leak.   4.) Large amount of fluid seen in the subcapsular region and retroperitoneal region. This most likely represents blood. The fluid presses the duodenum and pancreas anteriorlyand extends anterior to the IVC and aorta.   5.) No other solid organ injury identified.   6.) No fractures identified.   7.) Lungs bases clear.     COURSE & MEDICAL DECISION MAKING  Nursing notes, VS, PMSFHx reviewed in chart.    10:36 PM Patient seen and examined in the trauma bay. Patient will be treated with 4 mg Zofran, 0.5 mg Dilaudid. Ordered for chest x-ray, COD, CBC with differential, PTT, APTT, CMP, Estimated GFR, ABO confirmation to evaluate his symptoms. Patient informed of radiology findings and that I will discuss his case with surgery.     10:46 PM Paged Trauma Surgery.    10:51 PM I discussed patient's case and findings with Dr. Caepllan, Trauma Surgery. Dr. Capellan states he will see the patient in the ED.      12:19 AM Recheck patient. He is still fully well, has needed only 1 dose of pain medication. His vital signs remained stable. His pain is tolerable. Dr. Capellan present at bedside. Care of patient will be transferred to Dr. Capellan, Trauma Surgery, at this time. The plan for now appears to be watchful waiting rather than embolism or surgery. Patient understands and is agreeable to this plan of care.    DISPOSITION:  Patient will be admitted to Dr. Capellan, Trauma Surgery, in guarded  condition.      FINAL IMPRESSION  1. Kidney fracture     Cornelio WILLIS (Scribe), am scribing for, and in the presence of, Jony Mendoza M.D..    Electronically signed by: Cornelio Up (Scribe), 1/28/2017    Jony WILLIS M.D. personally performed the services described in this documentation, as scribed by Cornelio Up in my presence, and it is both accurate and complete.    The note accurately reflects work and decisions made by me.  Jony Mendoza  1/29/2017  4:53 AM

## 2017-01-29 NOTE — PROGRESS NOTES
Patient alert and oriented x4. Denies N/T. Took medications. Complains of pain 0/10, educated that there was pain medication available if needed. Diet tolerated, denies N/V. Turns self, skin intact. 1 assist with ambulation. Oxygen 98% on RA. No Perla, pt voiding in the urinals. Last BM 1-28-17. Normoactive bowel sounds. Right flank tender to touch, but pt reports that it feels better than before. POC discussed, questions and concerns addressed, hourly rounding in place, communication board updated, bed locked in lowest position.

## 2017-01-29 NOTE — PROGRESS NOTES
Patient is refusing bed alarm despite education about safety. A&O x4, ambulates SBA, bed in lowest position, call light within reach, appropriate signs in place.

## 2017-01-29 NOTE — PROGRESS NOTES
"Trauma / Surgical Progress Note  Interval Events:  Doing well tertiary survey neg   Abdomen improved   Advance diet change meds (itching from dilaudid), BRP's   Labs in am      Review of Systems   Gastrointestinal: Positive for abdominal pain.   Genitourinary: Positive for flank pain. Negative for hematuria.       Vitals:  Blood pressure 114/69, pulse 60, temperature 36.7 °C (98 °F), resp. rate 16, height 1.778 m (5' 10\"), weight 65.772 kg (145 lb), SpO2 99 %.  Temp (24hrs), Av.6 °C (97.9 °F), Min:36.3 °C (97.3 °F), Max:36.9 °C (98.4 °F)      IO:    Date 17 07 - 17 0659   Shift 6030-9401 8306-1671 1595-2468 24 Hour Total   I  N  T  A  K  E   Shift Total       O  U  T  P  U  T   Urine 0   0      Void (ml) 0   0    Stool          Number of Times Stooled 0 x   0 x    Shift Total 0   0   NET 0   0       Exam:  Respiratory: unlabored respirations, no intercostal retractions or accessory muscle use  Neuro: no focal deficits noted  Cardiovascular: regular rate and rhythm  GI: normal active bowel sounds  Other: General: mild distress    Labs:  Recent Results (from the past 24 hour(s))   PROTHROMBIN TIME    Collection Time: 17 10:47 PM   Result Value Ref Range    PT 13.8 12.0 - 14.6 sec    INR 1.03 0.87 - 1.13   APTT    Collection Time: 17 10:47 PM   Result Value Ref Range    APTT 26.0 24.7 - 36.0 sec   COMP METABOLIC PANEL    Collection Time: 17 10:47 PM   Result Value Ref Range    Sodium 137 135 - 145 mmol/L    Potassium 4.5 3.6 - 5.5 mmol/L    Chloride 103 96 - 112 mmol/L    Co2 21 20 - 33 mmol/L    Anion Gap 13.0 (H) 0.0 - 11.9    Glucose 116 (H) 65 - 99 mg/dL    Bun 16 8 - 22 mg/dL    Creatinine 1.72 (H) 0.50 - 1.40 mg/dL    Calcium 9.2 8.5 - 10.5 mg/dL    AST(SGOT) 27 12 - 45 U/L    ALT(SGPT) 17 2 - 50 U/L    Alkaline Phosphatase 55 30 - 99 U/L    Total Bilirubin 1.0 0.1 - 1.5 mg/dL    Albumin 4.7 3.2 - 4.9 g/dL    Total Protein 7.2 6.0 - 8.2 g/dL    Globulin 2.5 1.9 - 3.5 g/dL    " A-G Ratio 1.9 g/dL   COD (ADULT)    Collection Time: 01/28/17 10:47 PM   Result Value Ref Range    ABO Grouping Only A     Rh Grouping Only POS     Antibody Screen-Cod NEG    ESTIMATED GFR    Collection Time: 01/28/17 10:47 PM   Result Value Ref Range    GFR If African American >60 >60 mL/min/1.73 m 2    GFR If Non African American 50 (A) >60 mL/min/1.73 m 2   CBC WITH DIFFERENTIAL    Collection Time: 01/28/17 10:48 PM   Result Value Ref Range    WBC 11.7 (H) 4.8 - 10.8 K/uL    RBC 4.51 (L) 4.70 - 6.10 M/uL    Hemoglobin 15.0 14.0 - 18.0 g/dL    Hematocrit 42.8 42.0 - 52.0 %    MCV 94.9 81.4 - 97.8 fL    MCH 33.3 (H) 27.0 - 33.0 pg    MCHC 35.0 33.7 - 35.3 g/dL    RDW 44.0 35.9 - 50.0 fL    Platelet Count 202 164 - 446 K/uL    MPV 9.1 9.0 - 12.9 fL    Neutrophils-Polys 91.20 (H) 44.00 - 72.00 %    Lymphocytes 5.80 (L) 22.00 - 41.00 %    Monocytes 2.30 0.00 - 13.40 %    Eosinophils 0.10 0.00 - 6.90 %    Basophils 0.30 0.00 - 1.80 %    Immature Granulocytes 0.30 0.00 - 0.90 %    Nucleated RBC 0.00 /100 WBC    Neutrophils (Absolute) 10.64 (H) 1.82 - 7.42 K/uL    Lymphs (Absolute) 0.68 (L) 1.00 - 4.80 K/uL    Monos (Absolute) 0.27 0.00 - 0.85 K/uL    Eos (Absolute) 0.01 0.00 - 0.51 K/uL    Baso (Absolute) 0.03 0.00 - 0.12 K/uL    Immature Granulocytes (abs) 0.04 0.00 - 0.11 K/uL    NRBC (Absolute) 0.00 K/uL   ABO CONFIRMATION    Collection Time: 01/28/17 10:48 PM   Result Value Ref Range    Second ABO Typing With Cod A    URINALYSIS,CULTURE IF INDICATED    Collection Time: 01/28/17 11:15 PM   Result Value Ref Range    Color Yellow     Character Clear     Specific Gravity 1.033 <1.035    Ph 6.5 5.0-8.0    Glucose Negative Negative mg/dL    Ketones 80 (A) Negative mg/dL    Protein Negative Negative mg/dL    Bilirubin Negative Negative    Nitrite Negative Negative    Leukocyte Esterase Negative Negative    Occult Blood Large (A) Negative    Micro Urine Req Microscopic     Culture Indicated Yes UA Culture   URINE  MICROSCOPIC (W/UA)    Collection Time: 01/28/17 11:15 PM   Result Value Ref Range    WBC 5-10 (A) /hpf    -150 (A) /hpf    Mucous Threads Few /hpf       Problem and Plan:  Active Hospital Problems    Diagnosis   • Closed injury of right kidney [S37.091A]     Grade 3      • Right kidney injury [S37.001A]       Core Measures & Quality Metrics:  Labs reviewed, Medications reviewed and Radiology images reviewed  Perla catheter: No Perla      DVT Prophylaxis: Contraindicated - High bleeding risk  DVT prophylaxis - mechanical: SCDs

## 2017-01-29 NOTE — ED NOTES
Pt BIB med flight from [place]. Transferred for snowboarding accident, pt fell forward onto the snow, denies hitting head or any LOC. Pt was wearing a helmet. Denies neck or back pain. AOx4, GCS 15. Pt c/o right side and flank pain. Imaging done pta possible peritoneal rupture and involvement of the right renal artery. Pt given Dilaudid and Fentanyl for pain pta and 1.5 L NS. Pt resting comfortably now, states pain improved from earlier. Blood drawn.     Pt taken to [facility]. Report to [place].

## 2017-01-30 VITALS
TEMPERATURE: 98.8 F | DIASTOLIC BLOOD PRESSURE: 64 MMHG | RESPIRATION RATE: 16 BRPM | WEIGHT: 140.87 LBS | BODY MASS INDEX: 20.17 KG/M2 | SYSTOLIC BLOOD PRESSURE: 121 MMHG | OXYGEN SATURATION: 98 % | HEART RATE: 58 BPM | HEIGHT: 70 IN

## 2017-01-30 LAB
ALBUMIN SERPL BCP-MCNC: 3.9 G/DL (ref 3.2–4.9)
ALBUMIN/GLOB SERPL: 1.6 G/DL
ALP SERPL-CCNC: 41 U/L (ref 30–99)
ALT SERPL-CCNC: 13 U/L (ref 2–50)
ANION GAP SERPL CALC-SCNC: 8 MMOL/L (ref 0–11.9)
AST SERPL-CCNC: 15 U/L (ref 12–45)
BASOPHILS # BLD AUTO: 0.2 % (ref 0–1.8)
BASOPHILS # BLD: 0.02 K/UL (ref 0–0.12)
BILIRUB SERPL-MCNC: 1 MG/DL (ref 0.1–1.5)
BUN SERPL-MCNC: 10 MG/DL (ref 8–22)
CALCIUM SERPL-MCNC: 9.1 MG/DL (ref 8.5–10.5)
CHLORIDE SERPL-SCNC: 103 MMOL/L (ref 96–112)
CO2 SERPL-SCNC: 25 MMOL/L (ref 20–33)
CREAT SERPL-MCNC: 1.61 MG/DL (ref 0.5–1.4)
EOSINOPHIL # BLD AUTO: 0.03 K/UL (ref 0–0.51)
EOSINOPHIL NFR BLD: 0.3 % (ref 0–6.9)
ERYTHROCYTE [DISTWIDTH] IN BLOOD BY AUTOMATED COUNT: 46.3 FL (ref 35.9–50)
GFR SERPL CREATININE-BSD FRML MDRD: 54 ML/MIN/1.73 M 2
GLOBULIN SER CALC-MCNC: 2.4 G/DL (ref 1.9–3.5)
GLUCOSE SERPL-MCNC: 88 MG/DL (ref 65–99)
HCT VFR BLD AUTO: 42.6 % (ref 42–52)
HGB BLD-MCNC: 14.3 G/DL (ref 14–18)
IMM GRANULOCYTES # BLD AUTO: 0.03 K/UL (ref 0–0.11)
IMM GRANULOCYTES NFR BLD AUTO: 0.3 % (ref 0–0.9)
LYMPHOCYTES # BLD AUTO: 2.43 K/UL (ref 1–4.8)
LYMPHOCYTES NFR BLD: 23.2 % (ref 22–41)
MCH RBC QN AUTO: 32.9 PG (ref 27–33)
MCHC RBC AUTO-ENTMCNC: 33.6 G/DL (ref 33.7–35.3)
MCV RBC AUTO: 98.2 FL (ref 81.4–97.8)
MONOCYTES # BLD AUTO: 0.78 K/UL (ref 0–0.85)
MONOCYTES NFR BLD AUTO: 7.4 % (ref 0–13.4)
NEUTROPHILS # BLD AUTO: 7.2 K/UL (ref 1.82–7.42)
NEUTROPHILS NFR BLD: 68.6 % (ref 44–72)
NRBC # BLD AUTO: 0 K/UL
NRBC BLD AUTO-RTO: 0 /100 WBC
PLATELET # BLD AUTO: 180 K/UL (ref 164–446)
PMV BLD AUTO: 9.5 FL (ref 9–12.9)
POTASSIUM SERPL-SCNC: 4.2 MMOL/L (ref 3.6–5.5)
PROT SERPL-MCNC: 6.3 G/DL (ref 6–8.2)
RBC # BLD AUTO: 4.34 M/UL (ref 4.7–6.1)
SODIUM SERPL-SCNC: 136 MMOL/L (ref 135–145)
WBC # BLD AUTO: 10.5 K/UL (ref 4.8–10.8)

## 2017-01-30 PROCEDURE — 700105 HCHG RX REV CODE 258: Performed by: SURGERY

## 2017-01-30 PROCEDURE — A9270 NON-COVERED ITEM OR SERVICE: HCPCS | Performed by: SURGERY

## 2017-01-30 PROCEDURE — 700112 HCHG RX REV CODE 229: Performed by: SURGERY

## 2017-01-30 PROCEDURE — 80053 COMPREHEN METABOLIC PANEL: CPT

## 2017-01-30 PROCEDURE — 700102 HCHG RX REV CODE 250 W/ 637 OVERRIDE(OP): Performed by: SURGERY

## 2017-01-30 PROCEDURE — 36415 COLL VENOUS BLD VENIPUNCTURE: CPT

## 2017-01-30 PROCEDURE — 700111 HCHG RX REV CODE 636 W/ 250 OVERRIDE (IP): Performed by: SURGERY

## 2017-01-30 PROCEDURE — 85025 COMPLETE CBC W/AUTO DIFF WBC: CPT

## 2017-01-30 RX ORDER — ACETAMINOPHEN 325 MG/1
650 TABLET ORAL EVERY 6 HOURS
Qty: 30 TAB | Refills: 0 | COMMUNITY
Start: 2017-01-30 | End: 2017-02-22

## 2017-01-30 RX ORDER — OXYCODONE HYDROCHLORIDE 5 MG/1
5 TABLET ORAL
Qty: 20 TAB | Refills: 0 | Status: SHIPPED | OUTPATIENT
Start: 2017-01-30

## 2017-01-30 RX ORDER — PSEUDOEPHEDRINE HCL 30 MG
100 TABLET ORAL 2 TIMES DAILY
Qty: 60 CAP | COMMUNITY
Start: 2017-01-30

## 2017-01-30 RX ADMIN — DOCUSATE SODIUM 100 MG: 100 CAPSULE ORAL at 00:24

## 2017-01-30 RX ADMIN — ACETAMINOPHEN 650 MG: 325 TABLET, FILM COATED ORAL at 00:24

## 2017-01-30 RX ADMIN — POLYETHYLENE GLYCOL 3350 1 PACKET: 17 POWDER, FOR SOLUTION ORAL at 09:03

## 2017-01-30 RX ADMIN — DOCUSATE SODIUM 100 MG: 100 CAPSULE ORAL at 13:13

## 2017-01-30 RX ADMIN — MAGNESIUM HYDROXIDE 30 ML: 400 SUSPENSION ORAL at 09:03

## 2017-01-30 RX ADMIN — OXYCODONE HYDROCHLORIDE 5 MG: 5 TABLET ORAL at 14:10

## 2017-01-30 RX ADMIN — ACETAMINOPHEN 650 MG: 325 TABLET, FILM COATED ORAL at 13:13

## 2017-01-30 RX ADMIN — OXYCODONE HYDROCHLORIDE 5 MG: 5 TABLET ORAL at 09:03

## 2017-01-30 RX ADMIN — SODIUM CHLORIDE: 9 INJECTION, SOLUTION INTRAVENOUS at 01:57

## 2017-01-30 RX ADMIN — MORPHINE SULFATE 2 MG: 4 INJECTION INTRAVENOUS at 01:49

## 2017-01-30 RX ADMIN — ACETAMINOPHEN 650 MG: 325 TABLET, FILM COATED ORAL at 05:26

## 2017-01-30 ASSESSMENT — ENCOUNTER SYMPTOMS
ABDOMINAL PAIN: 0
SHORTNESS OF BREATH: 0
DIZZINESS: 0
CONSTIPATION: 0
NAUSEA: 0
NECK PAIN: 0
DOUBLE VISION: 0
VOMITING: 0
BLURRED VISION: 0
TINGLING: 0
BACK PAIN: 0
ROS GI COMMENTS: BM PTA
CHILLS: 0
HEADACHES: 0
MYALGIAS: 0
FEVER: 0
SENSORY CHANGE: 0
COUGH: 0

## 2017-01-30 ASSESSMENT — PAIN SCALES - GENERAL
PAINLEVEL_OUTOF10: 7
PAINLEVEL_OUTOF10: 5

## 2017-01-30 NOTE — CARE PLAN
Problem: Safety  Goal: Will remain free from falls  Room/floor clear. Proper signs up. Bed low and locked. Call light and belonging within reach. Patient calls appropriately. Hourly rounding in place.    Problem: Mobility  Goal: Risk for activity intolerance will decrease  Patient ambulates xSBA  With steady feet.

## 2017-01-30 NOTE — CARE PLAN
Problem: Safety  Goal: Will remain free from falls  Outcome: PROGRESSING AS EXPECTED  Fall precautions in place.  Bed alarm refused.  Pt calls appropriately for assistance.     Problem: Pain Management  Goal: Pain level will decrease to patient’s comfort goal  Outcome: PROGRESSING AS EXPECTED  Encouraging PO pain medications.  Educated on Morphine for breakthrough pain.

## 2017-01-30 NOTE — PROGRESS NOTES
AAOx 4.   On RA, sating 97%.  HOLLIS =, SBA, steady gait. Bed alarm refused.  -n/v.    Voiding in urinal in bathroom, +BS, -flatus, -BM.    Family at bedside.    Pain medicated per the MAR.

## 2017-01-30 NOTE — PROGRESS NOTES
"  Trauma/Surgical Progress Note    Author: Kathie Perla Date & Time created: 1/30/2017   12:26 PM     Interval Events:  Doing well  Adequate pain control  No difficulty with urination, no blood in urine  Kidney function stable  Tolerating a regular diet  Ambulating independently  Tertiary survey completed - no further findings  SBIRT/RAP completed  Possible discharge this afternoon     Review of Systems   Constitutional: Negative for fever, chills and malaise/fatigue.   Eyes: Negative for blurred vision and double vision.   Respiratory: Negative for cough and shortness of breath.    Cardiovascular: Negative for chest pain and leg swelling.   Gastrointestinal: Negative for nausea, vomiting, abdominal pain and constipation.        BM PTA   Genitourinary: Negative for dysuria, urgency and frequency.        Voiding   Musculoskeletal: Negative for myalgias, back pain and neck pain.        Right flank pain   Neurological: Negative for dizziness, tingling, sensory change and headaches.     Hemodynamics:  Blood pressure 114/69, pulse 55, temperature 37.1 °C (98.8 °F), resp. rate 16, height 1.778 m (5' 10\"), weight 63.9 kg (140 lb 14 oz), SpO2 97 %.     Respiratory:    Respiration: 16, Pulse Oximetry: 97 %           Fluids:    Intake/Output Summary (Last 24 hours) at 01/30/17 1226  Last data filed at 01/30/17 0800   Gross per 24 hour   Intake   2380 ml   Output   1550 ml   Net    830 ml     Admit Weight: 65.772 kg (145 lb)  Current      Physical Exam   Constitutional: He is oriented to person, place, and time. He appears well-developed. No distress.   HENT:   Head: Normocephalic.   Eyes: Pupils are equal, round, and reactive to light.   Neck: Neck supple.   Cardiovascular: Normal rate and normal heart sounds.    No murmur heard.  Pulmonary/Chest: Effort normal and breath sounds normal. No respiratory distress. He exhibits no tenderness.   Abdominal: Soft. Bowel sounds are normal. He exhibits no distension. There is " tenderness.   Right flank tenderness   Musculoskeletal: Normal range of motion. He exhibits no edema or tenderness.   Neurological: He is alert and oriented to person, place, and time.   Skin: Skin is warm and dry. He is not diaphoretic.   Psychiatric: He has a normal mood and affect. His behavior is normal. Judgment and thought content normal.   Nursing note and vitals reviewed.      Medical Decision Making/Problem List:    Active Hospital Problems    Diagnosis   • Closed injury of right kidney [S37.091A]     Priority: High     1/28 CT - Grade III injury of the right kidney with perinephric hematoma with no major extravasation of contrast.  1/30 - Kidney function stable     • Right kidney injury [S37.001A]     Core Measures & Quality Metrics:  Medications reviewed, Labs reviewed and Radiology images reviewed  Perla catheter: No Perla      DVT Prophylaxis: Not indicated at this time, ambulatory  DVT prophylaxis - mechanical: Not indicated at this time, ambulatory  Ulcer prophylaxis: Not indicated    Assessed for rehab: Patient returned to prior level of function, rehabilitation not indicated at this time    Total Score: 2  ETOH Screening  CAGE Score: 0  Intervention complete date: 1/30/2017  Patient response to intervention: Drinks occasionally, denies tobacco or illegal drug use. Declines further intervention.       Discussed patient condition with RN, Patient and trauma surgery. Dr. Capellan'Patient seen, data reviewed and discussed.  Agree with assessment and plan.

## 2017-01-30 NOTE — PROGRESS NOTES
BS report received. Pt resting in bed, alert and oriented x4. Family at bedside. Pt c/o pain 7/10, medicated per MAR. Denies nausea or vomiting. Pt on RA, sating >90%. PIV running at 83mL/hr. Pt ambulates xSBA with steady feet. POC discussed, verbalized understanding. Bed low and locked. Bed alarm not on. Educated pt risk of fall. Pt agreed to call before getting out of bed. Call light and belonging within reach. Hourly rounding in place.

## 2017-01-30 NOTE — PROGRESS NOTES
"No change from morning assessment except requiring more pain medications later this shift and reporting less relief after taking them.  Abdominal assessment benign, no swelling noted.  States \"it feels like when I first got hurt.\"  VS T97.6 P47 R20 B/P 128/69.  O2 Sat 95% on room air. APN notified of findings.  Orders received for repeat labs tonight.  "

## 2017-01-30 NOTE — PROGRESS NOTES
Went over d/c instructions with pt.  Follow up appt.  Medications.  Prescriptions and copy of d/c instructions given to pt.  Pt had no further questions.  Pt discharged by wheel chair at 1523.

## 2017-01-30 NOTE — PROGRESS NOTES
Lab results noted, patient and family notified they were no different than earlier.  Heat applied to abdomen has assisted in pain relief.

## 2017-01-30 NOTE — DISCHARGE INSTRUCTIONS
Discharge Instructions    Discharged to home by car with relative. Discharged via wheelchair, hospital escort: Yes.  Special equipment needed: Not Applicable    Be sure to schedule a follow-up appointment with your primary care doctor or any specialists as instructed.     Discharge Plan:   Diet Plan: Discussed  Activity Level: Discussed  Confirmed Follow up Appointment: Patient to Call and Schedule Appointment  Confirmed Symptoms Management: Discussed  Medication Reconciliation Updated: Yes  Influenza Vaccine Indication: Patient Refuses    I understand that a diet low in cholesterol, fat, and sodium is recommended for good health. Unless I have been given specific instructions below for another diet, I accept this instruction as my diet prescription.   Other diet:     Special Instructions:     - Call or seek medical attention for questions or concerns   - Follow up with Dr. Capellan or provider in [place] in 1 weeks time   - Follow up with primary care provider within one weeks time   - Resume regular diet   - Continue daily over the counter stool softener while on narcotics   - No operation of machinery or motorized vehicles while under the influence of narcotics   - No alcohol use while under the influence of narcotics   - No swimming, hot tubs, baths or wound submersion until cleared by outpatient provider. May shower   - No contact sports, strenuous activities, or heavy lifting until cleared by outpatient provider   - If respiratory decompensation, inability to void or blood in urine, or signs or symptoms of infection occur seek medical attention      · Is patient discharged on Warfarin / Coumadin?   No     · Is patient Post Blood Transfusion?  No    Depression / Suicide Risk    As you are discharged from this [facility] facility, it is important to learn how to keep safe from harming yourself.    Recognize the warning signs:  · Abrupt changes in personality, positive or negative- including increase in energy    · Giving away possessions  · Change in eating patterns- significant weight changes-  positive or negative  · Change in sleeping patterns- unable to sleep or sleeping all the time   · Unwillingness or inability to communicate  · Depression  · Unusual sadness, discouragement and loneliness  · Talk of wanting to die  · Neglect of personal appearance   · Rebelliousness- reckless behavior  · Withdrawal from people/activities they love  · Confusion- inability to concentrate     If you or a loved one observes any of these behaviors or has concerns about self-harm, here's what you can do:  · Talk about it- your feelings and reasons for harming yourself  · Remove any means that you might use to hurt yourself (examples: pills, rope, extension cords, firearm)  · Get professional help from the community (Mental Health, Substance Abuse, psychological counseling)  · Do not be alone:Call your Safe Contact- someone whom you trust who will be there for you.  · Call your local CRISIS HOTLINE 123-2434 or 653-792-8561  · Call your local Children's Mobile Crisis Response Team [place] (036) 303-9847 or [employer]  · Call the toll free National Suicide Prevention Hotlines   · National Suicide Prevention Lifeline 376-026-MIQF (6430)  · National Hope Line Network 800-SUICIDE (839-0428)    Acute Kidney Injury  Acute kidney injury is any condition in which there is sudden (acute) damage to the kidneys. Acute kidney injury was previously known as acute kidney failure or acute renal failure. The kidneys are two organs that lie on either side of the spine between the middle of the back and the front of the abdomen. The kidneys:  · Remove wastes and extra water from the blood.    · Produce important hormones. These help keep bones strong, regulate blood pressure, and help create red blood cells.    · Balance the fluids and chemicals in the blood and tissues.  A small amount of kidney damage may not cause problems, but a large  amount of damage may make it difficult or impossible for the kidneys to work the way they should. Acute kidney injury may develop into long-lasting (chronic) kidney disease. It may also develop into a life-threatening disease called end-stage kidney disease. Acute kidney injury can get worse very quickly, so it should be treated right away. Early treatment may prevent other kidney diseases from developing.  CAUSES   · A problem with blood flow to the kidneys. This may be caused by:    ¨ Blood loss.    ¨ Heart disease.    ¨ Severe burns.    ¨ Liver disease.  · Direct damage to the kidneys. This may be caused by:  ¨ Some medicines.    ¨ A kidney infection.    ¨ Poisoning or consuming toxic substances.    ¨ A surgical wound.    ¨ A blow to the kidney area.    · A problem with urine flow. This may be caused by:    ¨ Cancer.    ¨ Kidney stones.    ¨ An enlarged prostate.  SIGNS AND SYMPTOMS   · Swelling (edema) of the legs, ankles, or feet.    · Tiredness (lethargy).    · Nausea or vomiting.    · Confusion.    · Problems with urination, such as:    ¨ Painful or burning feeling during urination.    ¨ Decreased urine production.    ¨ Frequent accidents in children who are potty trained.    ¨ Bloody urine.    · Muscle twitches and cramps.    · Shortness of breath.    · Seizures.    · Chest pain or pressure.  Sometimes, no symptoms are present.   DIAGNOSIS  Acute kidney injury may be detected and diagnosed by tests, including blood, urine, imaging, or kidney biopsy tests.   TREATMENT  Treatment of acute kidney injury varies depending on the cause and severity of the kidney damage. In mild cases, no treatment may be needed. The kidneys may heal on their own. If acute kidney injury is more severe, your health care provider will treat the cause of the kidney damage, help the kidneys heal, and prevent complications from occurring. Severe cases may require a procedure to remove toxic wastes from the body (dialysis) or surgery to  repair kidney damage. Surgery may involve:   · Repair of a torn kidney.    · Removal of an obstruction.  HOME CARE INSTRUCTIONS  · Follow your prescribed diet.  · Take medicines only as directed by your health care provider.   · Do not take any new medicines (prescription, over-the-counter, or nutritional supplements) unless approved by your health care provider. Many medicines can worsen your kidney damage or may need to have the dose adjusted.    · Keep all follow-up visits as directed by your health care provider. This is important.  · Observe your condition to make sure you are healing as expected.  SEEK IMMEDIATE MEDICAL CARE IF:  · You are feeling ill or have severe pain in the back or side.    · Your symptoms return or you have new symptoms.  · You have any symptoms of end-stage kidney disease. These include:    ¨ Persistent itchiness.    ¨ Loss of appetite.    ¨ Headaches.    ¨ Abnormally dark or light skin.  ¨ Numbness in the hands or feet.    ¨ Easy bruising.    ¨ Frequent hiccups.    ¨ Menstruation stops.    · You have a fever.  · You have increased urine production.  · You have pain or bleeding when urinating.  MAKE SURE YOU:   · Understand these instructions.  · Will watch your condition.  · Will get help right away if you are not doing well or get worse.     This information is not intended to replace advice given to you by your health care provider. Make sure you discuss any questions you have with your health care provider.     Document Released: 07/02/2012 Document Revised: 01/08/2016 Document Reviewed: 08/16/2013  [employer] Patient Education ©2016 [employer]

## 2017-01-31 LAB
BACTERIA UR CULT: NORMAL
SIGNIFICANT IND 70042: NORMAL
SITE SITE: NORMAL
SOURCE SOURCE: NORMAL

## 2017-01-31 NOTE — DISCHARGE SUMMARY
DATE OF ADMISSION:  01/28/2017    DATE OF DISCHARGE:  01/30/2017    ATTENDING PHYSICIAN:  Enoc Capellan MD, trauma surgery.    CONSULTING PHYSICIANS:  No consulting physicians during this hospitalization.    DISCHARGE DIAGNOSES:  Closed injury of right kidney.    PROCEDURES:  No procedures during this hospitalization.    HISTORY OF PRESENT ILLNESS:  This is a 21-year-old male from the [place] area.  He   was skiing at [employer] and fell on his last turn of the day on his snowboard.    He fell forward landing on his stomach on right side and had significant pain   in the right flank and back.  The patient was evaluated in an outlying   facility and was found to have a rupture of the right kidney.  He remains   hemodynamically stable and was subsequently, transferred to [facility] for definitive trauma care.  He was triaged as a trauma green   transfer in accordance with established pre-hospital protocols.    HOSPITAL COURSE:  On arrival, the patient underwent extensive radiographic and   laboratory studies and was admitted to the critical care team under the   direction supervision of Dr. Capellan.  The patient sustained the above injuries   and underwent the above procedures during his stay.  The outside imaging was   reviewed and an additional imaging and laboratory studies were obtained.  The   patient was found to have a close injury of the right kidney, most likely a   grade III.  He also had elevated creatinine of unknown etiology, possibly   dehydration.  He was transferred from the emergency department to the   general surgical floor, where he was placed on bed rest, received significant   hydration, and serial laboratory studies and medical monitoring was   performed.    On the general surgical floor, a tertiary examination was completed.  No   further findings were identified.  Repeat laboratory studies showed a stable   hemoglobin and on day of discharge his hemoglobin was 14.3.  In addition,  his   BUN and creatinine remained unchanged, and on day of discharge his BUN was 10   and his creatinine was 1.61.  His GFR was 54, which was improved from   admission.  The patient continued to progress well and on the day of discharge   the patient is up ambulating independently, tolerating a regular diet,   reporting adequate pain control, and minimal pain to the right flank area.  He   is voiding without difficulty and reporting feeling well enough for discharge   home.    DISCHARGE PHYSICAL EXAMINATION:  See Epic physical examination dated   01/30/2017.    DISCHARGE MEDICATIONS:  1.  Oxycodone immediate release 5 mg tablets, take one tablet by mouth every   three hours as needed for moderate pain, dispensed 20, refills zero.  2.  Tylenol 325 mg tablets, take two tablets by mouth every six hours until   02/02/2017, over the counter.  3.  Docusate sodium 100 mg capsules, take 100 mg by mouth two times a day   while on narcotic pain medications, over the counter.    The patient's controlled substance history was reviewed and the above   medications were prescribed.    DISPOSITION:  The patient will be discharged home under the care and   supervision of his family on 01/30/2017, and he will follow up with Dr. Berrios   or his [place], [place] provider in one week's time or earlier.  The patient and   family have been extensively counseled and all questions have been answered.    Special attention was paid to signs and symptoms of infection, difficulty   voiding, or blood in his urine, and uncontrolled pain and to seek immediate   medical attention if these do develop.  The patient and family demonstrate   understanding and gave verbal compliance with these discharge instructions.    Time spent on discharge 45 minutes.       ____________________________________     JAS Clark / ALISHA    DD:  01/30/2017 14:14:09  DT:  01/30/2017 15:50:11    D#:  [id number]  Job#:  [id number]    cc: DEBRA BERRIOS MD